# Patient Record
Sex: MALE | Race: WHITE | Employment: FULL TIME | URBAN - METROPOLITAN AREA
[De-identification: names, ages, dates, MRNs, and addresses within clinical notes are randomized per-mention and may not be internally consistent; named-entity substitution may affect disease eponyms.]

---

## 2019-02-01 ENCOUNTER — HOSPITAL ENCOUNTER (OUTPATIENT)
Facility: CLINIC | Age: 59
Setting detail: OBSERVATION
Discharge: HOME | End: 2019-02-02
Attending: EMERGENCY MEDICINE | Admitting: HOSPITALIST

## 2019-02-01 ENCOUNTER — APPOINTMENT (OUTPATIENT)
Dept: ULTRASOUND IMAGING | Facility: CLINIC | Age: 59
End: 2019-02-01

## 2019-02-01 DIAGNOSIS — M79.662 PAIN OF LEFT CALF: ICD-10-CM

## 2019-02-01 LAB
ANION GAP SERPL CALCULATED.3IONS-SCNC: 8 MMOL/L (ref 3–14)
BASOPHILS # BLD AUTO: 0 10E9/L (ref 0–0.2)
BASOPHILS NFR BLD AUTO: 0.5 %
BUN SERPL-MCNC: 12 MG/DL (ref 7–30)
CALCIUM SERPL-MCNC: 8.9 MG/DL (ref 8.5–10.1)
CHLORIDE SERPL-SCNC: 105 MMOL/L (ref 94–109)
CO2 SERPL-SCNC: 25 MMOL/L (ref 20–32)
CREAT SERPL-MCNC: 0.77 MG/DL (ref 0.66–1.25)
DIFFERENTIAL METHOD BLD: NORMAL
EOSINOPHIL # BLD AUTO: 0.1 10E9/L (ref 0–0.7)
EOSINOPHIL NFR BLD AUTO: 0.9 %
ERYTHROCYTE [DISTWIDTH] IN BLOOD BY AUTOMATED COUNT: 12.6 % (ref 10–15)
GFR SERPL CREATININE-BSD FRML MDRD: >90 ML/MIN/{1.73_M2}
GLUCOSE SERPL-MCNC: 259 MG/DL (ref 70–99)
HCT VFR BLD AUTO: 43.5 % (ref 40–53)
HGB BLD-MCNC: 15.2 G/DL (ref 13.3–17.7)
IMM GRANULOCYTES # BLD: 0.1 10E9/L (ref 0–0.4)
IMM GRANULOCYTES NFR BLD: 0.7 %
LYMPHOCYTES # BLD AUTO: 1.9 10E9/L (ref 0.8–5.3)
LYMPHOCYTES NFR BLD AUTO: 25.4 %
MCH RBC QN AUTO: 30.9 PG (ref 26.5–33)
MCHC RBC AUTO-ENTMCNC: 34.9 G/DL (ref 31.5–36.5)
MCV RBC AUTO: 88 FL (ref 78–100)
MONOCYTES # BLD AUTO: 0.4 10E9/L (ref 0–1.3)
MONOCYTES NFR BLD AUTO: 4.7 %
NEUTROPHILS # BLD AUTO: 5.2 10E9/L (ref 1.6–8.3)
NEUTROPHILS NFR BLD AUTO: 67.8 %
NRBC # BLD AUTO: 0 10*3/UL
NRBC BLD AUTO-RTO: 0 /100
PLATELET # BLD AUTO: 190 10E9/L (ref 150–450)
POTASSIUM SERPL-SCNC: 3.9 MMOL/L (ref 3.4–5.3)
RBC # BLD AUTO: 4.92 10E12/L (ref 4.4–5.9)
SODIUM SERPL-SCNC: 138 MMOL/L (ref 133–144)
WBC # BLD AUTO: 7.6 10E9/L (ref 4–11)

## 2019-02-01 PROCEDURE — 85025 COMPLETE CBC W/AUTO DIFF WBC: CPT | Performed by: EMERGENCY MEDICINE

## 2019-02-01 PROCEDURE — 20950 MNTR INTRSTITIAL FLUID PRESS: CPT | Mod: LT

## 2019-02-01 PROCEDURE — 93971 EXTREMITY STUDY: CPT | Mod: LT

## 2019-02-01 PROCEDURE — 80048 BASIC METABOLIC PNL TOTAL CA: CPT | Performed by: EMERGENCY MEDICINE

## 2019-02-01 PROCEDURE — 99285 EMERGENCY DEPT VISIT HI MDM: CPT | Mod: 25

## 2019-02-01 PROCEDURE — 93926 LOWER EXTREMITY STUDY: CPT | Mod: LT

## 2019-02-01 SDOH — HEALTH STABILITY: MENTAL HEALTH: HOW OFTEN DO YOU HAVE A DRINK CONTAINING ALCOHOL?: NEVER

## 2019-02-01 ASSESSMENT — ENCOUNTER SYMPTOMS
NUMBNESS: 0
SHORTNESS OF BREATH: 0

## 2019-02-01 ASSESSMENT — MIFFLIN-ST. JEOR: SCORE: 1706.69

## 2019-02-02 ENCOUNTER — APPOINTMENT (OUTPATIENT)
Dept: PHYSICAL THERAPY | Facility: CLINIC | Age: 59
End: 2019-02-02

## 2019-02-02 VITALS
DIASTOLIC BLOOD PRESSURE: 82 MMHG | HEIGHT: 68 IN | BODY MASS INDEX: 32.51 KG/M2 | WEIGHT: 214.51 LBS | SYSTOLIC BLOOD PRESSURE: 135 MMHG | TEMPERATURE: 98.3 F | RESPIRATION RATE: 18 BRPM | HEART RATE: 77 BPM | OXYGEN SATURATION: 98 %

## 2019-02-02 PROBLEM — S80.12XA LEG HEMATOMA, LEFT, INITIAL ENCOUNTER: Status: ACTIVE | Noted: 2019-02-02

## 2019-02-02 LAB
ANION GAP SERPL CALCULATED.3IONS-SCNC: 8 MMOL/L (ref 3–14)
BASOPHILS # BLD AUTO: 0 10E9/L (ref 0–0.2)
BASOPHILS NFR BLD AUTO: 0.4 %
BUN SERPL-MCNC: 12 MG/DL (ref 7–30)
CALCIUM SERPL-MCNC: 8.6 MG/DL (ref 8.5–10.1)
CHLORIDE SERPL-SCNC: 107 MMOL/L (ref 94–109)
CK SERPL-CCNC: 142 U/L (ref 30–300)
CO2 SERPL-SCNC: 25 MMOL/L (ref 20–32)
CREAT SERPL-MCNC: 0.77 MG/DL (ref 0.66–1.25)
DIFFERENTIAL METHOD BLD: NORMAL
EOSINOPHIL # BLD AUTO: 0.1 10E9/L (ref 0–0.7)
EOSINOPHIL NFR BLD AUTO: 1.4 %
ERYTHROCYTE [DISTWIDTH] IN BLOOD BY AUTOMATED COUNT: 12.6 % (ref 10–15)
GFR SERPL CREATININE-BSD FRML MDRD: >90 ML/MIN/{1.73_M2}
GLUCOSE BLDC GLUCOMTR-MCNC: 235 MG/DL (ref 70–99)
GLUCOSE BLDC GLUCOMTR-MCNC: 266 MG/DL (ref 70–99)
GLUCOSE BLDC GLUCOMTR-MCNC: 291 MG/DL (ref 70–99)
GLUCOSE SERPL-MCNC: 240 MG/DL (ref 70–99)
HBA1C MFR BLD: 11.3 % (ref 0–5.6)
HCT VFR BLD AUTO: 40 % (ref 40–53)
HGB BLD-MCNC: 14 G/DL (ref 13.3–17.7)
IMM GRANULOCYTES # BLD: 0 10E9/L (ref 0–0.4)
IMM GRANULOCYTES NFR BLD: 0.3 %
INR PPP: 1.02 (ref 0.86–1.14)
LYMPHOCYTES # BLD AUTO: 1.6 10E9/L (ref 0.8–5.3)
LYMPHOCYTES NFR BLD AUTO: 23.6 %
MCH RBC QN AUTO: 31 PG (ref 26.5–33)
MCHC RBC AUTO-ENTMCNC: 35 G/DL (ref 31.5–36.5)
MCV RBC AUTO: 89 FL (ref 78–100)
MONOCYTES # BLD AUTO: 0.5 10E9/L (ref 0–1.3)
MONOCYTES NFR BLD AUTO: 7.7 %
NEUTROPHILS # BLD AUTO: 4.6 10E9/L (ref 1.6–8.3)
NEUTROPHILS NFR BLD AUTO: 66.6 %
NRBC # BLD AUTO: 0 10*3/UL
NRBC BLD AUTO-RTO: 0 /100
PLATELET # BLD AUTO: 178 10E9/L (ref 150–450)
POTASSIUM SERPL-SCNC: 3.8 MMOL/L (ref 3.4–5.3)
RBC # BLD AUTO: 4.51 10E12/L (ref 4.4–5.9)
SODIUM SERPL-SCNC: 140 MMOL/L (ref 133–144)
WBC # BLD AUTO: 6.9 10E9/L (ref 4–11)

## 2019-02-02 PROCEDURE — 97161 PT EVAL LOW COMPLEX 20 MIN: CPT | Mod: GP | Performed by: PHYSICAL THERAPIST

## 2019-02-02 PROCEDURE — 82550 ASSAY OF CK (CPK): CPT | Performed by: HOSPITALIST

## 2019-02-02 PROCEDURE — 80048 BASIC METABOLIC PNL TOTAL CA: CPT | Performed by: HOSPITALIST

## 2019-02-02 PROCEDURE — 97530 THERAPEUTIC ACTIVITIES: CPT | Mod: GP | Performed by: PHYSICAL THERAPIST

## 2019-02-02 PROCEDURE — 85025 COMPLETE CBC W/AUTO DIFF WBC: CPT | Performed by: HOSPITALIST

## 2019-02-02 PROCEDURE — 40000193 ZZH STATISTIC PT WARD VISIT: Performed by: PHYSICAL THERAPIST

## 2019-02-02 PROCEDURE — 25000128 H RX IP 250 OP 636: Performed by: HOSPITALIST

## 2019-02-02 PROCEDURE — 85610 PROTHROMBIN TIME: CPT | Performed by: HOSPITALIST

## 2019-02-02 PROCEDURE — 97116 GAIT TRAINING THERAPY: CPT | Mod: GP | Performed by: PHYSICAL THERAPIST

## 2019-02-02 PROCEDURE — 00000146 ZZHCL STATISTIC GLUCOSE BY METER IP

## 2019-02-02 PROCEDURE — G0378 HOSPITAL OBSERVATION PER HR: HCPCS

## 2019-02-02 PROCEDURE — 83036 HEMOGLOBIN GLYCOSYLATED A1C: CPT | Performed by: HOSPITALIST

## 2019-02-02 PROCEDURE — 36415 COLL VENOUS BLD VENIPUNCTURE: CPT | Performed by: HOSPITALIST

## 2019-02-02 PROCEDURE — 99220 ZZC INITIAL OBSERVATION CARE,LEVL III: CPT | Performed by: HOSPITALIST

## 2019-02-02 PROCEDURE — 96372 THER/PROPH/DIAG INJ SC/IM: CPT

## 2019-02-02 RX ORDER — DEXTROSE MONOHYDRATE 25 G/50ML
25-50 INJECTION, SOLUTION INTRAVENOUS
Status: DISCONTINUED | OUTPATIENT
Start: 2019-02-02 | End: 2019-02-02 | Stop reason: HOSPADM

## 2019-02-02 RX ORDER — BISACODYL 10 MG
10 SUPPOSITORY, RECTAL RECTAL DAILY PRN
Status: DISCONTINUED | OUTPATIENT
Start: 2019-02-02 | End: 2019-02-02 | Stop reason: HOSPADM

## 2019-02-02 RX ORDER — OXYCODONE HYDROCHLORIDE 5 MG/1
5-10 TABLET ORAL
Status: DISCONTINUED | OUTPATIENT
Start: 2019-02-02 | End: 2019-02-02 | Stop reason: HOSPADM

## 2019-02-02 RX ORDER — LIDOCAINE 40 MG/G
CREAM TOPICAL
Status: DISCONTINUED | OUTPATIENT
Start: 2019-02-02 | End: 2019-02-02 | Stop reason: HOSPADM

## 2019-02-02 RX ORDER — ONDANSETRON 2 MG/ML
4 INJECTION INTRAMUSCULAR; INTRAVENOUS EVERY 6 HOURS PRN
Status: DISCONTINUED | OUTPATIENT
Start: 2019-02-02 | End: 2019-02-02 | Stop reason: HOSPADM

## 2019-02-02 RX ORDER — ACETAMINOPHEN 650 MG/1
650 SUPPOSITORY RECTAL EVERY 4 HOURS PRN
Status: DISCONTINUED | OUTPATIENT
Start: 2019-02-02 | End: 2019-02-02 | Stop reason: HOSPADM

## 2019-02-02 RX ORDER — ACETAMINOPHEN 325 MG/1
650 TABLET ORAL EVERY 4 HOURS PRN
Status: DISCONTINUED | OUTPATIENT
Start: 2019-02-02 | End: 2019-02-02 | Stop reason: HOSPADM

## 2019-02-02 RX ORDER — NICOTINE POLACRILEX 4 MG
15-30 LOZENGE BUCCAL
Status: DISCONTINUED | OUTPATIENT
Start: 2019-02-02 | End: 2019-02-02 | Stop reason: HOSPADM

## 2019-02-02 RX ORDER — AMOXICILLIN 250 MG
1 CAPSULE ORAL 2 TIMES DAILY PRN
Status: DISCONTINUED | OUTPATIENT
Start: 2019-02-02 | End: 2019-02-02 | Stop reason: HOSPADM

## 2019-02-02 RX ORDER — ONDANSETRON 4 MG/1
4 TABLET, ORALLY DISINTEGRATING ORAL EVERY 6 HOURS PRN
Status: DISCONTINUED | OUTPATIENT
Start: 2019-02-02 | End: 2019-02-02 | Stop reason: HOSPADM

## 2019-02-02 RX ORDER — POLYETHYLENE GLYCOL 3350 17 G/17G
17 POWDER, FOR SOLUTION ORAL DAILY PRN
Status: DISCONTINUED | OUTPATIENT
Start: 2019-02-02 | End: 2019-02-02 | Stop reason: HOSPADM

## 2019-02-02 RX ORDER — NALOXONE HYDROCHLORIDE 0.4 MG/ML
.1-.4 INJECTION, SOLUTION INTRAMUSCULAR; INTRAVENOUS; SUBCUTANEOUS
Status: DISCONTINUED | OUTPATIENT
Start: 2019-02-02 | End: 2019-02-02 | Stop reason: HOSPADM

## 2019-02-02 RX ORDER — SODIUM CHLORIDE, SODIUM LACTATE, POTASSIUM CHLORIDE, CALCIUM CHLORIDE 600; 310; 30; 20 MG/100ML; MG/100ML; MG/100ML; MG/100ML
INJECTION, SOLUTION INTRAVENOUS CONTINUOUS
Status: DISCONTINUED | OUTPATIENT
Start: 2019-02-02 | End: 2019-02-02

## 2019-02-02 RX ORDER — AMOXICILLIN 250 MG
2 CAPSULE ORAL 2 TIMES DAILY PRN
Status: DISCONTINUED | OUTPATIENT
Start: 2019-02-02 | End: 2019-02-02 | Stop reason: HOSPADM

## 2019-02-02 RX ORDER — HYDROMORPHONE HYDROCHLORIDE 1 MG/ML
.2-.3 INJECTION, SOLUTION INTRAMUSCULAR; INTRAVENOUS; SUBCUTANEOUS
Status: DISCONTINUED | OUTPATIENT
Start: 2019-02-02 | End: 2019-02-02 | Stop reason: HOSPADM

## 2019-02-02 RX ORDER — LANOLIN ALCOHOL/MO/W.PET/CERES
3 CREAM (GRAM) TOPICAL
Status: DISCONTINUED | OUTPATIENT
Start: 2019-02-02 | End: 2019-02-02 | Stop reason: HOSPADM

## 2019-02-02 RX ADMIN — SODIUM CHLORIDE, POTASSIUM CHLORIDE, SODIUM LACTATE AND CALCIUM CHLORIDE: 600; 310; 30; 20 INJECTION, SOLUTION INTRAVENOUS at 02:31

## 2019-02-02 ASSESSMENT — MIFFLIN-ST. JEOR: SCORE: 1772.5

## 2019-02-02 NOTE — DISCHARGE SUMMARY
[facility]  Hospitalist Discharge Summary       Date of Admission:  2/1/2019  Date of Discharge:  2/2/2019  Discharging Provider: Raul Bronson MD      Discharge Diagnoses   Traumatic left leg hematoma  DM II    Follow-ups Needed After Discharge   Follow-up Appointments     Follow-up and recommended labs and tests       Follow up to establish primary care provider within 7 days for hospital follow- up and to discuss potential diabetes diagnosis.  Follow up with Dr. Caban of Vascular Surgery within 1 week.               Unresulted Labs Ordered in the Past 30 Days of this Admission     Date and Time Order Name Status Description    2/2/2019 0223 Hemoglobin A1c In process     2/2/2019 0223 Basic metabolic panel In process       These results will be followed up by: Hospitalist service    [facility]   Carlos Black is a 58 year old male admitted on 2/1/2019. He presented with traumatic left leg hematoma.    Traumatic left leg hematoma   Initial concern for possible early compartment syndrome, but patient reports improvement in left leg swelling following admission.  Vascular Surgery was consulted, recommended no further intervention, limited weight-bearing and follow up in clinic.     DM II  New diagnosis.  Incidentally noted to be hyperglycemic with subsequent A1C returning at 11.3%.  He will follow up to establish PCP and initiate therapy.      Consultations This Hospital Stay   VASCULAR SURGERY IP CONSULT  PHYSICAL THERAPY ADULT IP CONSULT    Code Status   Full Code    Time Spent on this Encounter   I, Raul Bronson, personally saw the patient today and spent less than or equal to 30 minutes discharging this patient.       Raul Bronson MD  [facility]  ______________________________________________________________________    Physical Exam   Vital Signs: Temp: 98.3  F (36.8  C) Temp src: Oral BP: 135/82 Pulse: 77   Resp: 18 SpO2: 98 % O2 Device: None (Room air)    Weight: 214 lbs  8.12 oz  General Appearance: Well developed, well nourished male in no acute distress  Respiratory: lungs CTAB, no wheezes or crackles  Cardiovascular: RRR, no m/r/g  GI: abdomen soft, nontender, nondistended, normal bowel sounds  Musc:  Left calf swollen but soft, no cyanosis and good cap refill in foot, left dorsalis pedis and posterior tibial pulses palpable  Other: Alert and appropriate, cranial nerves grossly intact        Primary Care Physician   Physician No Ref-Primary    Discharge Disposition   Discharged to home  Condition at discharge: Stable    Significant Results and Procedures   Most Recent 3 CBC's:  Recent Labs   Lab Test 02/02/19  0800 02/01/19  2140   WBC 6.9 7.6   HGB 14.0 15.2   MCV 89 88    190     Most Recent 3 BMP's:  Recent Labs   Lab Test 02/01/19  2140      POTASSIUM 3.9   CHLORIDE 105   CO2 25   BUN 12   CR 0.77   ANIONGAP 8   KHARI 8.9   *   ,   Results for orders placed or performed during the hospital encounter of 02/01/19   US Lower Extremity Venous Duplex Left    Narrative    ULTRASOUND VENOUS LEFT LOWER EXTREMITY WITH DOPPLER  2/1/2019 11:13 PM      HISTORY: Calf pain and swelling.    COMPARISON: None.    TECHNIQUE: Spectral waveform and color Doppler evaluation were  performed.    FINDINGS: A large 9.4 x 4.3 x 2.0 cm circumscribed heterogeneous  hypoechoic structure is present in the deep soft tissues of the mid  left calf. No blood flow is visualized within this structure. The  posterior tibial vein is suboptimally visualized. The posterior tibial  vein in the upper and mid calf appears to be noncompressible and no  blood flow is visualized within it. The adjacent popliteal vein  appears compressible, but minimal to no blood flow is visualized  within it. Normal compressibility of the left common femoral, femoral,  peroneal and greater saphenous veins. Decreased augmentation within  the left femoral vein. Unremarkable Doppler waveform evaluation of the  left common  femoral vein.      Impression    IMPRESSION:  1. The posterior tibial vein in the upper and mid left calf appears  noncompressible and does not have blood flow visualized within it  which is suggestive of thrombus; however, this vein is suboptimally  visualized and therefore this is not definite.  2. No other findings suspicious for thrombus in the major veins of the  left lower extremity.  3. Large 9 x 4 x 2 cm heterogeneous mass-like structure in the deep  soft tissues of the mid left calf is nonspecific, but could represent  a hematoma. Recommend clinical correlation.  4. Apparent decreased blood flow within the left popliteal vein and  lack of augmentation within the left femoral vein could relate to  compression of the more peripheral veins by the aforementioned 9 cm  mass-like structure.    JACKELINE CROUCH MD       Discharge Orders      Reason for your hospital stay    You were admitted for hematoma of the left leg.     Follow-up and recommended labs and tests     Follow up to establish primary care provider within 7 days for hospital follow- up and to discuss potential diabetes diagnosis.  Follow up with Dr. Caban of Vascular Surgery within 1 week.     Activity    Your activity upon discharge: activity as tolerated     When to contact your care team    Present to the emergency room if you develop worsening numbness or pain in left lower extremity, the foot becomes cold to touch or appearing blue.     Discharge Instructions    Use tylenol and ibuprofen and warm compresses as needed for pain control.  Elevate the left leg when resting.     Full Code     Diet    Follow this diet upon discharge:       Consistent Carbohydrate Diet [id number] Calories: Moderate Consistent CHO (4-6 CHO units/meal)     Discharge Medications   There are no discharge medications for this patient.    Allergies   No Known Allergies

## 2019-02-02 NOTE — CONSULTS
"[facility]ASC[facility]GERY HOSPITAL PATIENT CONSULTATION NOTE  Consulted by:IM  Reason for consultation: r/o compartment syndrome    HPI:  Carlos Black is a 58 year old male admitted through the ED with a LLE hematoma. He reports he was working as a  yesterday when he experienced a pop in his L calf and subsequent pain & swelling. He was worked up with a venous & arterial duplex in the ED which showed a 9x4 cm hematoma in his calf and a possible L posterior tibial DVT. He is not on blood thinners. Currently he reports his calf pain has been improving. He denies parasthesias/weakness. CK this AM was 142. Incidentally he was found to be hyperglycemic upon admission.    Review Of Systems: see above, otherwise negative    PAST MEDICAL HISTORY:  Past Medical History:   Diagnosis Date     STD (male)        PAST SURGICAL HISTORY:  Past Surgical History:   Procedure Laterality Date     ------------OTHER-------------      left finger cyst removal     TONSILLECTOMY         FAMILY HISTORY:  Family History   Problem Relation Age of Onset     Other - See Comments No family hx of         No known family history of coagulopathy       SOCIAL HISTORY:   Social History     Tobacco Use     Smoking status: Never Smoker     Smokeless tobacco: Never Used   Substance Use Topics     Alcohol use: No     Frequency: Never       HOME MEDICATIONS:  Prior to Admission medications    Not on File       VITAL SIGNS:  /82 (BP Location: Left arm)   Pulse 77   Temp 98.3  F (36.8  C) (Oral)   Resp 18   Ht 1.727 m (5' 8\")   Wt 97.3 kg (214 lb 8.1 oz)   SpO2 98%   BMI 32.62 kg/m      Intake/Output Summary (Last 24 hours) at 2/2/2019 0925  Last data filed at 2/2/2019 0640  Gross per 24 hour   Intake 430 ml   Output --   Net 430 ml       Labs:  ROUTINE IP LABS (Last four results)  BMP  Recent Labs   Lab 02/01/19  2140      POTASSIUM 3.9   CHLORIDE 105   KHARI 8.9   CO2 25   BUN 12   CR 0.77   *     CBC  Recent Labs   Lab " 02/02/19  0800 02/01/19  2140   WBC 6.9 7.6   RBC 4.51 4.92   HGB 14.0 15.2   HCT 40.0 43.5   MCV 89 88   MCH 31.0 30.9   MCHC 35.0 34.9   RDW 12.6 12.6    190     INR  Recent Labs   Lab 02/02/19  0800   INR 1.02       PHYSICAL EXAM:  Constitutional: healthy, alert, no acute distress and cooperative   Cardiovascular: RRR  Respiratory: CTAB anteriorly, breathing unlabored without secondary muscle use  Psychiatric: mentation appears normal and affect normal/bright  Neck: no asymmetry  GI/Abdomen: +BS, abdomen soft, non-tender. No masses, no CVAT  MSK: able to move all extremities without weakness or ataxia  Extremities: no open lesions, extremities warm and well perfused, L calf +nonpitting edema, mildly TTP, compartments soft, L pedal pulses palpable, motor intact   Hematology: no bruising on visible skin    IMAGING:   ULTRASOUND VENOUS LEFT LOWER EXTREMITY WITH DOPPLER  2/1/2019 11:13 PM        HISTORY: Calf pain and swelling.     COMPARISON: None.     TECHNIQUE: Spectral waveform and color Doppler evaluation were  performed.     FINDINGS: A large 9.4 x 4.3 x 2.0 cm circumscribed heterogeneous  hypoechoic structure is present in the deep soft tissues of the mid  left calf. No blood flow is visualized within this structure. The  posterior tibial vein is suboptimally visualized. The posterior tibial  vein in the upper and mid calf appears to be noncompressible and no  blood flow is visualized within it. The adjacent popliteal vein  appears compressible, but minimal to no blood flow is visualized  within it. Normal compressibility of the left common femoral, femoral,  peroneal and greater saphenous veins. Decreased augmentation within  the left femoral vein. Unremarkable Doppler waveform evaluation of the  left common femoral vein.                                                                      IMPRESSION:  1. The posterior tibial vein in the upper and mid left calf appears  noncompressible and does not  have blood flow visualized within it  which is suggestive of thrombus; however, this vein is suboptimally  visualized and therefore this is not definite.  2. No other findings suspicious for thrombus in the major veins of the  left lower extremity.  3. Large 9 x 4 x 2 cm heterogeneous mass-like structure in the deep  soft tissues of the mid left calf is nonspecific, but could represent  a hematoma. Recommend clinical correlation.  4. Apparent decreased blood flow within the left popliteal vein and  lack of augmentation within the left femoral vein could relate to  compression of the more peripheral veins by the aforementioned 9 cm  mass-like structure.    Patient Active Problem List   Diagnosis     Leg hematoma, left, initial encounter       ASSESSMENT:  58M with LLE hematoma without signs of compartment syndrome.      PLAN:  Elevation & warm compresses to LLE  No surgical intervention necessary  No anticoagulation needed  Ordered PT for crutches  Okay from vascular standpoint to discharge home  Follow up in 1 week with Dr. Mee Lima MD  Vascular Surgery Fellow  [facility]   Pager: [phone]      VASCULAR STAFF:  Examined with Dr. Lima     No evidence of compartment syndrome.  Conservative treatment.       Paulo Caban MD

## 2019-02-02 NOTE — PROGRESS NOTES
RECEIVING UNIT ED HANDOFF REVIEW    ED Nurse Handoff Report was reviewed by: Tesha Price on February 2, 2019 at 1:34 AM

## 2019-02-02 NOTE — PROGRESS NOTES
Discharge    Patient discharged to car via w/c with CNA  Care plan note done    Listed belongings gathered and returned to patient. Yes  Care Plan and Patient education resolved: Yes  Prescriptions if needed, hard copies sent with patient  NA  Home and hospital acquired medications returned to patient: NA  Medication Bin checked and emptied on discharge yes  Follow up appointment made for patient: No

## 2019-02-02 NOTE — PLAN OF CARE
Discharge Planner PT   Patient plan for discharge: discharge to home  Current status: PT: Order received; 1x evaluation and treatment completed. Patient admitted with L leg hematoma after a fall; can be WBAT. (-) for compartment syndrome.  At baseline patient lives with a friend in an apt; 2 steps to enter building; no use of an assistive device at baseline; Reports hitting head in his fall but no trouble per his report; works as a ; On eval patient reports no significant pain in L calf but just stiffness; able to perform bed mobility independently; sit<>stand SBA initially and then noted to be independent; trial of crutches in hallway and without crutches; patient declines need for crutches to unweight L LE and wants to go without; reports he will buy some if he changes his mind. Patient reports financial concerns. Declined stair trial; Encouraged to do ankle pumps, heelslides and mobilize as able. No further PT indicated at this time  Barriers to return to prior living situation: none anticipated  Recommendations for discharge: Home  Rationale for recommendations: Patient able to mobilize with antalgic gait independently and refusing use of an assistive device.       Entered by: Breonna Mcknight 02/02/2019 10:37 AM

## 2019-02-02 NOTE — PLAN OF CARE
Physical Therapy Discharge Summary    Reason for therapy discharge:    Discharged to home.    Progress towards therapy goal(s). See goals on Care Plan in [facility] electronic health record for goal details.  Goals partially met.  Barriers to achieving goals:   discharge from facility.    Therapy recommendation(s):    No further therapy is recommended.  Continue walking and gentle ex's/elevation

## 2019-02-02 NOTE — ED NOTES
"[facility]  ED Nurse Handoff Report    ED Chief complaint: Leg Pain (Left leg heard a pop in calf today around 3pm. Leg is tight and foot is cool to touch.)      ED Diagnosis:   Final diagnoses:   Pain of left calf       Code Status: See prior    Allergies: No Known Allergies    Activity level - Baseline/Home:  Independent    Activity Level - Current:   Independent     Needed?: No    Isolation: No  Infection: Not Applicable  Bariatric?: No    Vital Signs:   Vitals:    02/01/19 2057   BP: 165/89   Pulse: 94   Resp: 18   Temp: 98.5  F (36.9  C)   TempSrc: Oral   SpO2: 98%   Weight: 90.7 kg (200 lb)   Height: 1.727 m (5' 8\")       Cardiac Rhythm: ,        Pain level:      Is this patient confused?: No   Does this patient have a guardian?  No         If yes, is there guardianship documents in the Epic \"Code/ACP\" activity?  N/A         Guardian Notified?  N/A  [place] - Suicide Severity Rating Scale Completed?  Yes  If yes, what color did the patient score?  White    Patient Report: Initial Complaint: Calf pain  Focused Assessment: Swollen left calf. See US results. PT states he was walking earlier today and felt a \"pop\" and noticed the swelling shortly after. CMS intact in foot. Pedal pulse present. L foot feels cooler when compared to R. Concerns for developing compartment syndrome. Refusing pain medications. Pt ambulating but not able to bare weight to L leg.   Tests Performed: labs and US  Abnormal Results:   Labs Ordered and Resulted from Time of ED Arrival Up to the Time of Departure from the ED   BASIC METABOLIC PANEL - Abnormal; Notable for the following components:       Result Value    Glucose 259 (*)     All other components within normal limits   CBC WITH PLATELETS DIFFERENTIAL       Treatments provided: N/a    Family Comments: N/A    OBS brochure/video discussed/provided to patient/family: N/A              Name of person given brochure if not patient: n/a              " Relationship to patient: n/a    ED Medications: Medications - No data to display    Drips infusing?:  No    For the majority of the shift this patient was Green.   Interventions performed were Routine ED cares.    Severe Sepsis OR Septic Shock Diagnosis Present: No    To be done/followed up on inpatient unit:  N.a    [id number]

## 2019-02-02 NOTE — CONSULTS
"Per bedside RN pt requesting assistance to find a PCP. Chart reviewed.  CTS-RN met wt pt. Pt is A&Ox4.  He states he does not have a PCP and does not have health insurance.  He states \"somebody\" gave him information about health insurance.  I called and left a message to Bibiana/Financial Counselor via voicemail to call patient and assist him with health insurance needs.  I also advised pt to call Bibiana on Monday if he has not heard from her by Monday afternoon.  Pt was given choices for PCP and pt chose to go to a sliding scale clinic in [facility] where he states he lives.  Pt was given resources of 7 sliding scale clinics in [facility].  CTS-RN unable to schedule pt's F/U appts with a PCP and Dr Caban as it is Saturday.   Pt to call clinic of his choice in [facility] to establish PCP and to call Dr Caban's clinic on Monday to schedule his F/U appt.  Explained to patient the importance of his F/U wt a PCP (his Hgb A1C is 11.3)  and Dr Caban.  Pt verbalized understanding and has no other concerns at discharge.    "

## 2019-02-02 NOTE — PLAN OF CARE
Pt alert and oriented. Tolerated regular diet. Refused insulin injection and also refused crutches per PT,MD informed. States pain in left calf is 6/10 but refused any analgesia.  Encouraged to keep leg elevated. Discharged per w/c, has car in parking lot.

## 2019-02-02 NOTE — H&P
[facility]    History and Physical  Hospitalist       Date of Admission:  2/1/2019  Date of Service (when I saw the patient): 02/02/19    ASSESSMENT  Carlos Black is a very pleasant 58 year old gentleman without known significant past medical history who presents with traumatic left leg hematoma causing possible early compartment syndrome.    PLAN    1) Traumatic left leg hematoma causing possible early compartment syndrome:     -- Observation, NPO. Vascular Surgery consulted. Serial exams. Will call them emergently overnight if he develops worsening pallor, or new loss of sensation or loss of pulse.     -- LR 75 ml/hour IV fluid. Tylenol, Oxycodone, IV Dilaudid as needed for pain. Anti-emetics as needed. Repeat CBC in AM.    2) Hyperglycemia: Seen incidentally. He says he has never been tested for Diabetes before. We will check A1c and use ISS insulin while hospitalized.    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 He adds that the day prior while at work he had slipped and fallen and struck the back of his head without loss of consciousness but at that time he does not think he struck his leg. The symptoms are exacerbated with weight bearing and relieved with rest. He has never had bleeding problems or blood clots in the past and denies use of aspirin or NSAIDs at home. There was concern on arrival to the ED of coldness in the left foot  "though he denies feeling cold anywhere, or any loss of sensation or paresthesias, or any other acute complaints.    In the ED, Blood pressure 165/89, pulse 94, temperature 98.5  F (36.9  C), temperature source Oral, resp. rate 18, height 1.727 m (5' 8\"), weight 90.7 kg (200 lb), SpO2 98 %.    CBC and BMP were unremarkable. Glucose 259. Venous Ultrasound showed: \"a large 9 x 4 x 2 cm heterogeneous mass-like structure in the deep soft tissues of the mid left calf. The posterior tibial vein in the upper and mid left calf appeared noncompressible and did not have blood flow visualized within it. Apparent decreased blood flow within the left popliteal vein and lack of augmentation within the left femoral vein could relate to compression of the more peripheral veins by the aforementioned 9 cm mass-like structure\".  Arterial ultrasound also done and reportedly negative for acute pathology. Compartment pressure check reportedly revealed 63 mmHG. The case was discussed with Vascular Surgery and further observation was recommended for now.    PHYSICAL EXAM  Blood pressure 165/89, pulse 94, temperature 98.5  F (36.9  C), temperature source Oral, resp. rate 18, height 1.727 m (5' 8\"), weight 90.7 kg (200 lb), SpO2 98 %.  Constitutional: Alert and oriented to person, place and time; no apparent distress  HEENT: normocephalic moist mucus membranes  Respiratory: lungs clear to auscultation bilaterally  Cardiovascular: regular S1 S2   GI: abdomen soft non tender non distended bowel sounds positive  Lymph/Hematologic: no pallor, no cervical lymphadenopathy  Skin: no rash, good turgor  Musculoskeletal: left lower extremity edema and warmth in the calf and tenderness posterior to the patella, with palpable pulses in the affected extremity, no pallor at this time  Neurologic: extra-ocular muscles intact; moves all four extremities  Psychiatric: appropriate affect, insight and judgment     DVT Prophylaxis: Pneumatic Compression " Devices  Code Status: Full Code    Disposition: Expected discharge in 0-2 days essentially pending Vascular Surgery assessment and clinical course    Khurram Pettit MD    Past Medical History    I have reviewed this patient's medical history and updated it with pertinent information if needed.   Past Medical History:   Diagnosis Date     STD (male)        Past Surgical History   I have reviewed this patient's surgical history and updated it with pertinent information if needed.  Past Surgical History:   Procedure Laterality Date     ------------OTHER-------------      left finger cyst removal     TONSILLECTOMY         Prior to Admission Medications   None     Allergies   No Known Allergies    Social History   I have reviewed this patient's social history and updated it with pertinent information if needed. Carlos Black  reports that  has never smoked. he has never used smokeless tobacco. He reports that he does not drink alcohol or use drugs.    Family History   Family history assessed and, except as above, is non-contributory.    Family History   Problem Relation Age of Onset     Other - See Comments No family hx of         No known family history of coagulopathy       Review of Systems   The 10 point Review of Systems is negative other than noted in the HPI or here.     Primary Care Physician   Physician No Ref-Primary    Data   Labs Ordered and Resulted from Time of ED Arrival Up to the Time of Departure from the ED   BASIC METABOLIC PANEL - Abnormal; Notable for the following components:       Result Value    Glucose 259 (*)     All other components within normal limits   CBC WITH PLATELETS DIFFERENTIAL       Data reviewed today:  I personally reviewed no images or EKG's today.    Recent Results (from the past 24 hour(s))   US Lower Extremity Venous Duplex Left    Narrative    ULTRASOUND VENOUS LEFT LOWER EXTREMITY WITH DOPPLER  2/1/2019 11:13 PM      HISTORY: Calf pain and swelling.    COMPARISON:  None.    TECHNIQUE: Spectral waveform and color Doppler evaluation were  performed.    FINDINGS: A large 9.4 x 4.3 x 2.0 cm circumscribed heterogeneous  hypoechoic structure is present in the deep soft tissues of the mid  left calf. No blood flow is visualized within this structure. The  posterior tibial vein is suboptimally visualized. The posterior tibial  vein in the upper and mid calf appears to be noncompressible and no  blood flow is visualized within it. The adjacent popliteal vein  appears compressible, but minimal to no blood flow is visualized  within it. Normal compressibility of the left common femoral, femoral,  peroneal and greater saphenous veins. Decreased augmentation within  the left femoral vein. Unremarkable Doppler waveform evaluation of the  left common femoral pain.      Impression    IMPRESSION:  1. The posterior tibial vein in the upper and mid left calf appears  noncompressible and does not have blood flow visualized within it  which is suggestive of thrombus; however, this vein is suboptimally  visualized and therefore this is not definite.  2. No other convincing evidence of thrombus in the major veins of the  left lower extremity.  3. Large 9 x 4 x 2 cm heterogeneous mass-like structure in the deep  soft tissues of the mid left calf is nonspecific, but could represent  a hematoma. Recommend clinical correlation.  4. Apparent decreased blood flow within the left popliteal vein and  lack of augmentation within the left femoral vein could relate to  compression of the more peripheral veins by the aforementioned 9 cm  mass-like structure.

## 2019-02-02 NOTE — PROGRESS NOTES
Admission    Patient arrives to room 604-1 via cart from ER  Care plan note: completed    Inpatient nursing criteria listed below were met:    PCD's Documented: NA  Skin issues/needs documented :NA  Isolation education started/completed NA  Patient allergies verified with patient: Yes  Verified completion of Hector Risk Assessment Tool:  Yes  Verified completion of Guardianship screening tool: Yes  Fall Prevention: Care plan updated, Education given and documented Yes  Care Plan initiated: Yes  Home medications documented in belongings flowsheet: NA  Patient belongings documented in belongings flowsheet: Yes  Reminder note (belongings/ medications) placed in discharge instructions:NA  Admission profile/ required documentation complete: Yes

## 2019-02-02 NOTE — ED PROVIDER NOTES
"  History     Chief Complaint:  Leg Pain    HPI   Carlos Black is a 58 year old male who presents to the emergency department today for evaluation of left leg pain. 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 He explains that today at 1500 he was standing on the bed of a truck with his left leg hanging and his right leg bearing all weight when he went to pull down the door and heard a pop in his left calf as if \"someone threw a stone at it.\" Since then he reports swelling and pain to his left calf, decreased range of motion of his ankle due to tightness, and a cool left foot. The patient denies any chest pain, numbness, shortness of breath, blood thinner use, or history of blood clots or heart issues.     Allergies:  No Known Drug Allergies    Medications:    Medications reviewed. No current medications.   Denies blood thinner use    Past Medical History:    Medical history reviewed. No pertinent medical history.  Denies history of blood clots or heart issues    Past Surgical History:    Surgical history reviewed. No pertinent surgical history.    Family History:    Family history reviewed. No pertinent family history.     Social History:  Smoking Status: Never Smoker  Smokeless Tobacco: Never Used  Alcohol Use: Negative  Drug Use: Negative     Review of Systems   Respiratory: Negative for shortness of breath.    Cardiovascular: Positive for leg swelling. Negative for chest pain.        Positive for leg pain   Musculoskeletal:        Left leg tightness  Left foot cold   Neurological: Negative for numbness.   All other systems reviewed and are negative.      Physical Exam     Patient Vitals for the past 24 hrs:   BP Temp Temp src Pulse Resp SpO2 Height Weight   02/02/19 0100 106/78 -- -- 90 -- -- -- --   02/01/19 2300 (!) 129/91 -- -- -- -- -- -- --   02/01/19 2057 165/89 98.5  F (36.9  C) Oral 94 18 98 % 1.727 m (5' 8\") 90.7 kg (200 lb)     Physical Exam  VS: Reviewed per above  HENT: Mucous membranes " moist  EYES: sclera anicteric  CV: Rate as noted, regular rhythm.   RESP: Effort normal. Breath sounds are normal bilaterally.  GI: no tenderness, not distended.  NEURO: Alert, moving all extremities  MSK: Left calf is firm compared to the right, left toes are cool compared to the right, palpable DP pulse in the left foot.  Pain with passive dorsiflexion of the left ankle, no sensory loss in the left foot or lower extremity to light touch.  Intact strength with plantar and dorsiflexion of the ankle in left lower extremity as well as great toe flexion and extension.  SKIN: Warm and dry    Emergency Department Course     Imaging:  Radiology findings were communicated with the patient who voiced understanding of the findings.    US Lower Extremity Venous Duplex Left  1. The posterior tibial vein in the upper and mid left calf appears  noncompressible and does not have blood flow visualized within it  which is suggestive of thrombus; however, this vein is suboptimally  visualized and therefore this is not definite.  2. No other convincing evidence of thrombus in the major veins of the  left lower extremity.  3. Large 9 x 4 x 2 cm heterogeneous mass-like structure in the deep  soft tissues of the mid left calf is nonspecific, but could represent  a hematoma. Recommend clinical correlation.  4. Apparent decreased blood flow within the left popliteal vein and  lack of augmentation within the left femoral vein could relate to  compression of the more peripheral veins by the aforementioned 9 cm  mass-like structure.  Reading per radiology    US Lower Extremity Arterial Duplex Left:  Pending    Procedures:    Compartment Pressure Measurement:   Using the [employer] compartment pressure measurement system the medial left calf skin was cleansed with chlorhexidine and with sterile technique, an 18-gauge needle attached to the compartment measurement device was inserted just posterior to the medial border of the left tibia and  directed posteriolaterally toward the posterior border of the fibula.  After advancing the needle approximately 3 cm, approximately 0.3 mL of sterile saline was injected into the deep posterior compartment and pressure measurement was noted to be 63.  Measurement of pt's blood pressure shortly after this procedure was noted to be 115/75 and 112/72 in both the left and right upper extremities respectively.    Laboratory:  Laboratory findings were communicated with the patient who voiced understanding of the findings.    CBC: WBC 7.6, HGB 15.2,   BMP: Glucose 259 (H) o/w WNL (Creatinine 0.77)    Emergency Department Course:    2105 Nursing notes and vitals reviewed.    2115 I performed an exam of the patient as documented above.     2206 The patient was sent for ultrasounds while in the emergency department, results above.     2348 I spoke with vascular surgery regarding patient's presentation, findings, and plan of care.    0100 I performed the compartment check procedure as noted above.     0106 I spoke with vascular surgery regarding patient's presentation, findings, and plan of care.    0117 I spoke with Dr. Roberts of the hospitalist service from [facility] regarding patient's presentation, findings, and plan of care.    0130 I personally reviewed the imaging results with the patient and answered all related questions prior to admission.    Impression & Plan      Medical Decision Making:  Carlos Black is a 58 year old male who presents to the emergency department today for evaluation of left calf pain and swelling.  Initial vital signs notable for blood pressure 165/89.  Exam notable for left calf  firm compared to the right, left toes are cool compared to the right, palpable DP pulse in the left foot.  Pain with passive dorsiflexion of the left ankle, no sensory loss in the left foot or lower extremity.  Due to concern for decreased perfusion in the left lower extremity, venous and arterial  ultrasounds were obtained.  There is no evidence of arterial occlusion or aneurysm on ultrasound tech interpretation of arterial US, although formal radiology read was pending.  On venous ultrasound there is a 9 x 4 x 2 cm heterogeneous mass in the calf concerning for hematoma.  There is also poor flow in the posterior tibial vein which could be thrombus or simply compromised blood flow secondary to large mass in the calf.  Upon reassessment of patient's leg, the toes were no longer cool and patient had strong palpable DP pulse however the calf was still firm and there is persistent pain with passive dorsiflexion of the left ankle.  I obtained compartment pressure measurements of the deep posterior compartment of the left calf which was 63.  Relative to diastolic blood pressure was obtained shortly thereafter of 75 and 72, there is less than 30 units between these 2 values which could be concerning for early compartment syndrome.  I discussed the case with vascular surgery fellow Dr Lima and recommended that if patient was admitted that concerning findings would be sensory changes or development of severe pain/swelling in the left lower extremity, which patient did not have at present. Patient was admitted to the hospitalist for further observation of aforementioned findings.    Diagnosis:    ICD-10-CM    1. Pain of left calf M79.662      Disposition:   The patient is admitted into the care of Dr. Pettit.    Scribe Disclosure:  IRemy, am serving as a scribe at 1:14 AM on 2/2/2019 to document services personally performed by Jose Wen MD based on my observations and the provider's statements to me.       EMERGENCY DEPARTMENT       Jose Wen MD  02/02/19 0143

## 2019-02-02 NOTE — PROGRESS NOTES
Seen at bedside. Compartments in LLE swollen but soft. Pain improving.  this AM. Palpable pedal pulse. Clinically unlikely to have compartment syndrome. Full consult to follow.      STAFF:  Patient seen with Dr Lima.  ? Trauma to left mid posterior calf with large hematoma on Duplex.  This is improving and less sore and swollen.  +3 left DP pulse and decreased PT (likely due to hematoma compression). Normal CMS noted now.    No evidence of compartment syndrome. Conservative treatment with elevation, heat, and time, Weight bearing as tolerated (PT to see for crutches).       Paulo Caban MD

## 2019-02-02 NOTE — PROGRESS NOTES
02/02/19 1009   Quick Adds   Type of Visit Initial PT Evaluation   Living Environment   Lives With friend(s)   Living Arrangements apartment  (1st level)   Home Accessibility stairs to enter home  (no rail)   Number of Stairs, Main Entrance two   Stair Railings, Main Entrance none   Living Environment Comment lives in an apt with a friend   Self-Care   Usual Activity Tolerance excellent   Current Activity Tolerance moderate   Equipment Currently Used at Home none   Activity/Exercise/Self-Care Comment normally independent with all mobility without a device   Functional Level Prior   Ambulation 0-->independent   Transferring 0-->independent   Toileting 0-->independent   Bathing 0-->independent   Cognition 0 - no cognition issues reported   Fall history within last six months yes   Number of times patient has fallen within last six months 1   Which of the above functional risks had a recent onset or change? ambulation;transferring;fall history   Prior Functional Level Comment patient normally independent without an assistive device   General Information   Onset of Illness/Injury or Date of Surgery - Date 02/01/19   Referring Physician Bess Lima MD   Patient/Family Goals Statement return home today   Pertinent History of Current Problem (include personal factors and/or comorbidities that impact the POC) per chart: Carlos Black is a very pleasant 58 year old gentleman without known significant past medical history who presents with traumatic left leg hematoma; (-) for compartment syndrome per Dr. Caban; can be WBAT; patient under Observation cares   Precautions/Limitations fall precautions   Weight-Bearing Status - LLE weight-bearing as tolerated   General Observations patient in bed upon arrival; agreeable to PT eval   General Info Comments Activity: ambulate with assist   Cognitive Status Examination   Orientation orientation to person, place and time   Level of Consciousness alert   Follows Commands and Answers  "Questions 100% of the time   Personal Safety and Judgment intact   Memory intact   Pain Assessment   Patient Currently in Pain No  (tightness in L calf)   Integumentary/Edema   Integumentary/Edema Comments L LE hematoma   Posture    Posture Comments WFL   Range of Motion (ROM)   ROM Comment L lower leg limited by hematoma and tightness; others appear WFL   Strength   Strength Comments WFL   Bed Mobility   Bed Mobility Comments independent   Transfer Skills   Transfer Comments SBA   Gait   Gait Comments CGA to SBA initially; gait antalgic without a device; improved with use of a crutch   Balance   Balance Comments intact   Sensory Examination   Sensory Perception Comments intact   Modality Interventions   Planned Modality Interventions Comments told to alternate heat/ice by Vascular surgeon per patient report   General Therapy Interventions   Planned Therapy Interventions gait training;transfer training;progressive activity/exercise   Clinical Impression   Criteria for Skilled Therapeutic Intervention yes, treatment indicated   PT Diagnosis impaired gait/transfers   Influenced by the following impairments L calf stiffness; antalgic gait; decreased L LE ROM   Functional limitations due to impairments antalgic gait   Clinical Presentation Stable/Uncomplicated   Clinical Presentation Rationale independent with mobility; supportive living environment; medically stable   Clinical Decision Making (Complexity) Low complexity   Therapy Frequency` other (see comments)  (1x eval and treatment)   Predicted Duration of Therapy Intervention (days/wks) 1x eval and treatment   Anticipated Equipment Needs at Discharge other (see comments)  (declines crutches)   Anticipated Discharge Disposition Home   Risk & Benefits of therapy have been explained Yes   Patient, Family & other staff in agreement with plan of care Yes   [facility] AM-PAC TM \"6 Clicks\"   2016, Trustees of [facility], under license to [employer].  All " "rights reserved.   6 Clicks Short Forms Basic Mobility Inpatient Short Form   [facility] AM-PAC  \"6 Clicks\" V.2 Basic Mobility Inpatient Short Form   1. Turning from your back to your side while in a flat bed without using bedrails? 4 - None   2. Moving from lying on your back to sitting on the side of a flat bed without using bedrails? 4 - None   3. Moving to and from a bed to a chair (including a wheelchair)? 4 - None   4. Standing up from a chair using your arms (e.g., wheelchair, or bedside chair)? 4 - None   5. To walk in hospital room? 3 - A Little   6. Climbing 3-5 steps with a railing? 3 - A Little   Basic Mobility Raw Score (Score out of 24.Lower scores equate to lower levels of function) 22   Total Evaluation Time   Total Evaluation Time (Minutes) 10     "

## 2019-02-02 NOTE — PLAN OF CARE
"Patient is alert and oriented.  Up independently in room.  IV fluids infusing per orders.  Complains of \"cramping\" pain in LLE, declined offers of pain medication.  CMS intact to bilateral lower extremities.  +2 edema to LLE.  NPO for vascular consult today. Blood glucose 291, 2 units of coverage given.   "

## 2019-02-07 ENCOUNTER — HOSPITAL ENCOUNTER (OUTPATIENT)
Dept: ULTRASOUND IMAGING | Facility: CLINIC | Age: 59
Discharge: HOME | End: 2019-02-07
Attending: SURGERY | Admitting: SURGERY

## 2019-02-07 ENCOUNTER — OFFICE VISIT (OUTPATIENT)
Dept: OTHER | Facility: CLINIC | Age: 59
End: 2019-02-07
Attending: SURGERY

## 2019-02-07 VITALS — HEART RATE: 80 BPM | DIASTOLIC BLOOD PRESSURE: 84 MMHG | SYSTOLIC BLOOD PRESSURE: 129 MMHG

## 2019-02-07 DIAGNOSIS — S80.12XA LEG HEMATOMA, LEFT, INITIAL ENCOUNTER: ICD-10-CM

## 2019-02-07 DIAGNOSIS — S80.12XA LEG HEMATOMA, LEFT, INITIAL ENCOUNTER: Primary | ICD-10-CM

## 2019-02-07 DIAGNOSIS — S80.12XD HEMATOMA OF LEG, LEFT, SUBSEQUENT ENCOUNTER: Primary | ICD-10-CM

## 2019-02-07 PROCEDURE — G0463 HOSPITAL OUTPT CLINIC VISIT: HCPCS

## 2019-02-07 PROCEDURE — 93971 EXTREMITY STUDY: CPT | Mod: LT

## 2019-02-07 PROCEDURE — 99213 OFFICE O/P EST LOW 20 MIN: CPT | Mod: ZP | Performed by: SURGERY

## 2019-02-07 NOTE — LETTER
[facility] [address]  [place] [address]  Phone: [phone]  Fax: [phone]      2019       Re: Carlos Black - 1960    Carlos Black was in a walk-in appointment to see me today.  This 58-year-old  was closed in the back of his truck standing on his left leg with his right leg hanging over of the step and noticed acute pain and swelling of his right calf.  He was seen in the emergency department on 2019 with a question of compartment syndrome.  Patient was admitted and we saw him in consultation.  Ultrasound revealed a 9 x 4 cm hematoma and a possible left posterior tibial DVT.  Hematoma which was in the proximal one third of the calf appear to be compressing the popliteal vein also.  Initial arterial ultrasound revealed adequate flow in all 3 tibial arteries.     On our evaluation he had no clinical signs of compartment syndrome though a very swollen tender calf.  He had a strong palpable DP pulse and a decreased PT pulse felt due to the hematoma compression with normal CMS.  Being clinically stable and was discharged home. We plan to follow-up ultrasound in the office to reevaluate the popliteal and posterior tibial veins which we felt more likely compressed by the large hematoma not thrombosed.      Upon arrival home he was elevating his leg above his heart he was sulfa.  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  He noticed more swelling in his calf and also ecchymosis now developing in the popliteal region and the distal medial calf above the ankle.  This was concerning and thus he came to see me today to reevaluate the situation.     Exam: Alert and appropriate.  Walking somewhat gingerly due to soreness in his left calf.              Left calf is still swollen though the compartments are soft.              Ecchymosis now appreciated in the popliteal  region and also in the distal medial calf and ankle region.              Sensations intact to the entire foot.  Normal motor function.              Pedal pulses are difficult to palpate.              Foot is somewhat cooler but is been outside all day working and temperatures are in the low teens today.     Bedside Doppler reveals a +3 triphasic waveform the left posterior tibial artery.  The dorsalis pedis and anterior tibial signal is diminished and reversed in flow implying with flow from the anterior tibial artery which was normal on previous exam.      We performed a duplex.  Hematoma still present measuring approximately same measuring 9.2 x 4.6 x 1.6 cm in the more proximal calf.  Popliteal and pedal veins were patent with no DVT.      Impression: Left proximal calf hematoma.  With no specific trauma there is certainly a possibility when he was standing on the back of his truck he may have ruptured the right plantaris tendon. This certainly can occur but usually associate with more aggressive activity.  Bleeding can occur in the deep posterior compartment with the rupture of this tendon this may be the only possible explanation for his events.  Hematoma is still present and the swelling is more prominent due to the fluid that gets a tract in the hematoma before it resolves.  No evidence of DVT and very adequate arterial blood supply though I suspect the posterior tibial artery is a dominant artery explaining the Doppler findings.  The bruising is expected as the blood breaks down and travels down to the compartments to the ankle region.     I did reassure the patient that this will gradually improve.  He should still elevate his leg is much as possible above his heart and apply heat to the area.  He still needs to work and when driving I have given him a E Spandagrip to use to keep some compression on and help control the swelling with his leg dependent.     So discussed the function of the plantaris  muscle/tendon which is quite minimal.  Elite athletes have done well with excision of this muscle and tendon for popliteal artery entrapment syndrome.  To diagnose a rupture we have to perform an MRI or CT scan and I do not feel this will give any further information with this affect are treatment recommendations and would be a cost burden to the patient with his insurance issues.  He is aware that may take many weeks before the swelling and bruising resolves.        No specific vascular follow-up is indicated unless problems should develop in the understands this.           Paulo Caban MD

## 2019-02-07 NOTE — NURSING NOTE
"Carlos Black is a 58 year old male who presents for:  Chief Complaint   Patient presents with     Consult     history of left leg hematoma, pt consulted in hospital on 2/2/19 by Dr. Mee Diazs:    Vitals:    02/07/19 1553   BP: 129/84   BP Location: Left arm   Patient Position: Sitting   Cuff Size: Adult Regular   Pulse: 80       BMI:  Estimated body mass index is 32.62 kg/m  as calculated from the following:    Height as of 2/1/19: 5' 8\" (1.727 m).    Weight as of 2/2/19: 214 lb 8.1 oz (97.3 kg).    Pain Score:  Data Unavailable        Orly Huston      "

## 2019-02-07 NOTE — PROGRESS NOTES
[place] [facility]    Carlos Black was in a walk-in appointment to see me today.  This 58-year-old  was closed in the back of his truck standing on his left leg with his right leg hanging over of the step and noticed acute pain and swelling of his right calf.  He was seen in the emergency department on 2/1/2019 with a question of compartment syndrome.  Patient was admitted and we saw him in consultation.  Ultrasound revealed a 9 x 4 cm hematoma and a possible left posterior tibial DVT.  Hematoma which was in the proximal one third of the calf appear to be compressing the popliteal vein also.  Initial arterial ultrasound revealed adequate flow in all 3 tibial arteries.    On our evaluation he had no clinical signs of compartment syndrome though a very swollen tender calf.  He had a strong palpable DP pulse and a decreased PT pulse felt due to the hematoma compression with normal CMS.  Being clinically stable and was discharged home.  We plan to follow-up ultrasound in the office to reevaluate the popliteal and posterior tibial veins which we felt more likely compressed by the large hematoma not thrombosed.      Upon arrival home he was elevating his leg above his heart he was sulfa.  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  He noticed more swelling in his calf and also ecchymosis now developing in the popliteal region and the distal medial calf above the ankle.  This was concerning and thus he came to see me today to reevaluate the situation.    Exam: Alert and appropriate.  Walking somewhat gingerly due to soreness in his left calf.              Left calf is still swollen though the compartments are soft.               Ecchymosis now appreciated in the popliteal region and also in the distal medial calf and ankle region.              Sensations intact to the entire foot.  Normal motor function.              Pedal pulses  are difficult to palpate.              Foot is somewhat cooler but is been outside all day working and temperatures are in the low teens today.    Bedside Doppler reveals a +3 triphasic waveform the left posterior tibial artery.  The dorsalis pedis and anterior tibial signal is diminished and reversed in flow implying with flow from the anterior tibial artery which was normal on previous exam.      We performed a duplex.  Hematoma still present measuring approximately same measuring 9.2 x 4.6 x 1.6 cm in the more proximal calf.  Popliteal and pedal veins were patent with no DVT.      Impression: Left proximal calf hematoma.  With no specific trauma there is certainly a possibility when he was standing on the back of his truck he may have ruptured the right plantaris tendon.  This certainly can occur but usually associate with more aggressive activity.  Bleeding can occur in the deep posterior compartment with the rupture of this tendon this may be the only possible explanation for his events.  Hematoma is still present and the swelling is more prominent due to the fluid that gets a tract in the hematoma before it resolves.  No evidence of DVT and very adequate arterial blood supply though I suspect the posterior tibial artery is a dominant artery explaining the Doppler findings.  The bruising is expected as the blood breaks down and travels down to the compartments to the ankle region.                   I did reassure the patient that this will gradually improve.  He should still elevate his leg is much as possible above his heart and apply heat to the area.  He still needs to work and when driving I have given him a E Spandagrip to use to keep some compression on and help control the swelling with his leg dependent.                  So discussed the function of the plantaris muscle/tendon which is quite minimal.  Elite athletes have done well with excision of this muscle and tendon for popliteal artery entrapment  syndrome.  To diagnose a rupture we have to perform an MRI or CT scan and I do not feel this will give any further information with this affect are treatment recommendations and would be a cost burden to the patient with his insurance issues.  He is aware that may take many weeks before the swelling and bruising resolves.      We spent 20 minutes in the office today with over 50% counseling.  No specific vascular follow-up is indicated unless problems should develop in the understands this.       Paulo Caban MD

## 2019-10-23 ENCOUNTER — ANCILLARY PROCEDURE (OUTPATIENT)
Dept: GENERAL RADIOLOGY | Facility: CLINIC | Age: 59
End: 2019-10-23
Attending: FAMILY MEDICINE
Payer: OTHER MISCELLANEOUS

## 2019-10-23 ENCOUNTER — OFFICE VISIT (OUTPATIENT)
Dept: URGENT CARE | Facility: URGENT CARE | Age: 59
End: 2019-10-23
Payer: OTHER MISCELLANEOUS

## 2019-10-23 VITALS
DIASTOLIC BLOOD PRESSURE: 66 MMHG | HEART RATE: 86 BPM | TEMPERATURE: 98.3 F | SYSTOLIC BLOOD PRESSURE: 118 MMHG | OXYGEN SATURATION: 95 %

## 2019-10-23 DIAGNOSIS — S99.911A ANKLE INJURY, RIGHT, INITIAL ENCOUNTER: Primary | ICD-10-CM

## 2019-10-23 DIAGNOSIS — S82.831A OTHER CLOSED FRACTURE OF DISTAL END OF RIGHT FIBULA, INITIAL ENCOUNTER: ICD-10-CM

## 2019-10-23 PROCEDURE — 99204 OFFICE O/P NEW MOD 45 MIN: CPT | Performed by: FAMILY MEDICINE

## 2019-10-23 PROCEDURE — 73630 X-RAY EXAM OF FOOT: CPT | Mod: RT

## 2019-10-23 PROCEDURE — 73610 X-RAY EXAM OF ANKLE: CPT | Mod: RT

## 2019-10-23 NOTE — PROGRESS NOTES
Chief Complaint   Patient presents with     Urgent Care     Pt states rt ankle injury from a fall of a ladder sxs x today 5 am      Work Comp     SUBJECTIVE:  Chief Complaint   Patient presents with     Urgent Care     Pt states rt ankle injury from a fall of a ladder sxs x today 5 am      Work Comp     Carlos Black is a 59 year old male presents with a chief complaint of right ankle and foot pain, swelling, tenderness and decreased range of motion.  The injury occurred 4.5 hour(s) ago. He cannot bear weight  The injury happened while at work. How: fall , trauma: after fall from a ladder while at work from a height of 4 feet .The patient complained of moderate pain  and has had decreased ROM.  Pain exacerbated by walking, weight-bearing and flexion/extension.  Relieved by rest.  He treated it initially with no therapy. This is not the first time this type of injury has occurred to this patient.   Family History   Problem Relation Age of Onset     Other - See Comments No family hx of         No known family history of coagulopathy         Past Medical History:   Diagnosis Date     STD (male)      No current outpatient medications on file.     Social History     Tobacco Use     Smoking status: Never Smoker     Smokeless tobacco: Never Used   Substance Use Topics     Alcohol use: No     Frequency: Never       ROS:  10 point ROS of systems including Constitutional, Eyes, Respiratory, Cardiovascular, Gastroenterology, Genitourinary, Integumentary,Psychiatric were all negative except for pertinent positives noted in my HPI           EXAM:   /66   Pulse 86   Temp 98.3  F (36.8  C) (Oral)   SpO2 95%   Gen: healthy,alert,no distress  Extremity: ankle and foot has swelling, point tenderness  and decreased ROM over the   Rt lateral malleolus and also right 5th metatarsal bone   There is not compromise to the distal circulation.  Pulses are +2 and CRT is brisk  GENERAL APPEARANCE: healthy, alert and no  distress  EXTREMITIES: peripheral pulses normal  SKIN: no suspicious lesions or rashes  NEURO: Normal strength and tone, sensory exam grossly normal, mentation intact and speech normal    X-RAY was done.showed tip of fibula fracture and also some arthiitis changes     ASSESSMENT:   Carlos was seen today for urgent care and work comp.    Diagnoses and all orders for this visit:    Ankle injury, right, initial encounter  -     XR Ankle Right G/E 3 Views  -     XR Foot Right G/E 3 Views    Other closed fracture of distal end of right fibula, initial encounter  -     order for DME; Equipment being ordered: DME  Cam walker          PLAN:  1) Rest, Ice, Compress, Elevate and Ibuprofen q 6 hrs for 3-5 days  Pt refused the cam walker which was provided to pt   He wanted the ace wrap  Advised to do the crutches that pt was given   He needs to follow up with ortho in 3 days if pain does not get better  Follow up if  symptoms fail to improve or worsens   Pt understood and agreed with plan

## 2019-10-30 ENCOUNTER — OFFICE VISIT (OUTPATIENT)
Dept: URGENT CARE | Facility: URGENT CARE | Age: 59
End: 2019-10-30
Payer: OTHER MISCELLANEOUS

## 2019-10-30 VITALS — DIASTOLIC BLOOD PRESSURE: 79 MMHG | SYSTOLIC BLOOD PRESSURE: 141 MMHG | TEMPERATURE: 98.4 F | HEART RATE: 97 BPM

## 2019-10-30 DIAGNOSIS — S82.831A OTHER CLOSED FRACTURE OF DISTAL END OF RIGHT FIBULA, INITIAL ENCOUNTER: Primary | ICD-10-CM

## 2019-10-30 PROCEDURE — 99214 OFFICE O/P EST MOD 30 MIN: CPT | Performed by: PHYSICIAN ASSISTANT

## 2019-10-30 NOTE — LETTER
[address] [address]  [phone]        2019    REPORT OF WORK ABILITY    PATIENT DATA  Employee Name: Carlos Black        : 1960   [id number]  Work related injury: YES  Today's date: 2019  Date of injury: 10/23/2019    PROVIDER EVALUATION: Please fill in as needed.  Please give copy to employee for employer.  1. Diagnosis: right ankle injury/Distal fibula fracture  2. Treatment: no weight bearing: ace wrap and crutches (unable to comfortably fit in cam walker boot  3. Medication: tylenol as needed.    NOTE: When ordering a medication, [place] Rules require Work Comp or WC on prescriptions.  4. Return to work date: patient may not bear weight on his right lower extremity until he has followed up with Orthopedics.        RESTRICTIONS: Unlimited unless listed.  Restrictions apply to home and leisure also.  If work within restrictions is not available, the employee is totally disabled.  Provider comments: Patient must follow up with Orthopedics within 48 hours.    Medical Examiner: Deonna Kim PA-C      License or registration: [id number]    Next appointment: within 48 hours    CC: Employer, Managed Care Plan/Payor, Patient

## 2019-10-30 NOTE — PROGRESS NOTES
Patient presents with:  Urgent Care: present for follow up of closed fracture of distal end of right fibula.  Work Comp    SUBJECTIVE:  Chief Complaint   Patient presents with     Urgent Care     present for follow up of closed fracture of distal end of right fibula.     Work Comp     Carlos Black is a 59 year old male presents with a chief complaint of right ankle and foot pain persisting since injury at work on 10/23/19.  He had xray's of his ankle and foot in urgent care that day and has been non weightbearing since, using crutches.   He tried the cam walker immobilization boot on his foot/ankle at that visit, but was unable to tolerate it due to the pain.      Injury was sustained at work on 10/23/19 wherein he stepped on a step stand that tipped and fell as he stepped on it.  He twisted his ankle and foot and fell.      SH: Works at [employer]      Past Medical History:   Diagnosis Date     STD (male)      Patient Active Problem List   Diagnosis     Leg hematoma, left, initial encounter     Social History     Tobacco Use     Smoking status: Never Smoker     Smokeless tobacco: Never Used   Substance Use Topics     Alcohol use: No     Frequency: Never       ROS:  CONSTITUTIONAL:NEGATIVE for fever, chills, change in weight  INTEGUMENTARY/SKIN: as per HPI  EYES: NEGATIVE for vision changes or irritation  ENT/MOUTH: NEGATIVE for ear, mouth and throat problems  RESP:NEGATIVE for significant cough or SOB  CV: NEGATIVE for chest pain, palpitations or peripheral edema  GI: NEGATIVE for nausea, abdominal pain, heartburn, or change in bowel habits  MUSCULOSKELETAL: as per HPI  NEURO: NEGATIVE for weakness, dizziness or paresthesias  ENDOCRINE: NEGATIVE for temperature intolerance, skin/hair changes  HEME/ALLERGY/IMMUNE: NEGATIVE for bleeding problems  Review of systems negative except as stated above.    EXAM:   BP (!) 141/79   Pulse 97   Temp 98.4  F (36.9  C) (Oral)   Gen: healthy,alert,no  distress  Extremity: right lower extremity:  Ecchymosis and swelling of foot and ankle.  Tender.    There is not compromise to the distal circulation.  Pulses are +2 and CRT is brisk  GENERAL APPEARANCE: healthy, alert and no distress  SKIN: no suspicious lesions or rashes  NEURO: Normal strength and tone, sensory exam grossly normal, mentation intact and speech normal    Review of xray from previous visit is suspicious for distal fibular fracture.      (S8.766E) Other closed fracture of distal end of right fibula, initial encounter  (primary encounter diagnosis)  Comment:   Plan: ORTHOPEDICS ADULT REFERRAL          Patient to connect with his work comp provider to obtain claim number.  Claim number is needed to make Orthopedic follow up appointment.      Patient declined pain medications.    Patient unable to tolerate Cam walker secondary to pain.  Using crutches from previous visit.      We attempted to make an appointment today for urgent follow up, but without claim number we were unable to procure an appointment.

## 2019-10-30 NOTE — PATIENT INSTRUCTIONS
(P81.532B) Other closed fracture of distal end of right fibula, initial encounter  (primary encounter diagnosis)  Comment:   Plan: ORTHOPEDICS ADULT REFERRAL          Patient to connect with his work comp provider to obtain claim number.  Claim number is needed to make Orthopedic follow up appointment.      We attempted to make an appointment today for urgent follow up, but without claim number we were unable to procure an appointment.

## 2019-11-04 ENCOUNTER — NURSE TRIAGE (OUTPATIENT)
Dept: NURSING | Facility: CLINIC | Age: 59
End: 2019-11-04

## 2019-11-04 ENCOUNTER — TELEPHONE (OUTPATIENT)
Dept: NURSING | Facility: CLINIC | Age: 59
End: 2019-11-04

## 2019-11-04 NOTE — TELEPHONE ENCOUNTER
Workman's comp. Sent in referral and authorization for work comp. MRI of right foot and ankle. Needs paper work and order. Fax: [phone]. Claudia,  to set up MRI, send it to her attention. Call [phone] with questions for Claudia.  Ramona Lara RN-[facility]

## 2019-11-04 NOTE — TELEPHONE ENCOUNTER
Workman's comp. Sent in referral and authorization for work comp. MRI of right foot and ankle. Needs paper work and order. Fax: [phone]. Claudia,  to set up MRI, send it to her attention. Call [phone] with questions for Claudia.  Epic encounter sent to urgent care where he was seen.  Ramona Lara RN-[facility]

## 2019-11-05 NOTE — TELEPHONE ENCOUNTER
[facility]      If patient calls back please ask if he went to his orthopedic referral as requested during his visit 10/30/2019. If so let him know the MRI for his right foot ankle was not ordered through [place] but possibly from the orthopedic. He would need to contact them for further assistance. Jessica Tadeo MA